# Patient Record
(demographics unavailable — no encounter records)

---

## 2024-11-27 NOTE — ASSESSMENT
[Diabetes Foot Care] : diabetes foot care [Long Term Vascular Complications] : long term vascular complications of diabetes [Carbohydrate Consistent Diet] : carbohydrate consistent diet [Importance of Diet and Exercise] : importance of diet and exercise to improve glycemic control, achieve weight loss and improve cardiovascular health [Hypoglycemia Management] : hypoglycemia management [Action and use of Insulin] : action and use of short and long-acting insulin [Self Monitoring of Blood Glucose] : self monitoring of blood glucose [Injection Technique, Storage, Sharps Disposal] : injection technique, storage, and sharps disposal [Retinopathy Screening] : Patient was referred to ophthalmology for retinopathy screening [FreeTextEntry1] : Diabetes, likely type 2 vs SHERRELL Diagnosed 15 yrs ago, +zinc transporter Ab, 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 has underlying CKD, nephropathy and b/l retinopathy, saw nephrology and no longer on losartan, egfr 19  POC HgbA1c 6.6% today, markedly improved.  Reviewwed labs from 4/4/24 (scanned into EHR), tsh normal at 3, noted crt elevated at 3.99, egfr 39, urine microalb/crt elevated. Continue f/u with nephro Patient is on MDI, checking fs 3-4x.day and noted frequent adjustment in insulin therapy, good candidate for CGM device. Waiting for DEXCOM g7 Continue Lantus 20 qhs (advised to keep consistent dosing) - [facility] Continue premeal lispro 8-12-12U TID ac Continue Ozempic 0.5mg weekly PCP recently discontinued lisinopril due to swelling of feet and replaced with Amlodipine.  Continue Atorvastatin 40mg daily, ) for HLD, LDL was noted 108mg/ld as per labs reviewed from 4/4/24. No h/o foot ulcers, up to date with ophthalmology, has h/o bilateral retinopathy,.  Answered all questions today; patient verbalized understanding of the above RTO in 3 months.

## 2024-11-27 NOTE — PHYSICAL EXAM
[Alert] : alert [No Acute Distress] : no acute distress [Well Developed] : well developed [Normal Sclera/Conjunctiva] : normal sclera/conjunctiva [EOMI] : extra ocular movement intact [No Proptosis] : no proptosis [No Lid Lag] : no lid lag [Normal Hearing] : hearing was normal [No LAD] : no lymphadenopathy [Supple] : the neck was supple [Thyroid Not Enlarged] : the thyroid was not enlarged [No Thyroid Nodules] : no palpable thyroid nodules [No Respiratory Distress] : no respiratory distress [No Accessory Muscle Use] : no accessory muscle use [Normal Rate and Effort] : normal respiratory rate and effort [Clear to Auscultation] : lungs were clear to auscultation bilaterally [Normal S1, S2] : normal S1 and S2 [No Murmurs] : no murmurs [Normal Rate] : heart rate was normal [Regular Rhythm] : with a regular rhythm [Acanthosis Nigricans] : acanthosis nigricans present [Normal Sensation on Monofilament Testing] : normal sensation on monofilament testing of lower extremities [Oriented x3] : oriented to person, place, and time [Normal Insight/Judgement] : insight and judgment were intact [Abdominal Striae] : no abdominal striae [Foot Ulcers] : no foot ulcers

## 2024-11-27 NOTE — HISTORY OF PRESENT ILLNESS
[Continuous Glucose Monitoring] : Continuous Glucose Monitoring: Yes [Jesus] : Jesus [FreeTextEntry1] : ROLANDO GUERRA  is a 40 yo male with past medical history of diabetes w/ CKD, nephropathy and b/l retinopathy, HTN, HLD who presents for management of diabetes.  He was diagnosed w/ diabetes 15 years ago. He has not been seen in endocrine office since Feb 2023. He is currently taking Trulicity 3mg weekly, Lantus 15-25U at bedtime and humalog 10U TId ac. He notes PCP recently prescribed Omeprazole however not using yet.  Today patient is here with wife.  Recalls AM fasting ranges from 80-150s, prelunch 100-130, bedtime ranges frmo 200. He denies hypoglycemic symptoms.  Regarding DM complications, he has underlying b/l retinopathy s/p laser injections and surgery, follows regularly with ophthalmology, last visit was last week.  He also has CKD and proteinuria. He notes improvement in polyuria and polydipsia and nephrology, has renal biopsy.   [FreeTextEntry2] : 70 [FreeTextEntry3] : 23+7 [FreeTextEntry4] : 0+0 [de-identified] : 7.2 [FreeTextEntry5] : 161

## 2025-03-04 NOTE — PHYSICAL EXAM
[Alert] : alert [Normal Sclera/Conjunctiva] : normal sclera/conjunctiva [Normal Outer Ear/Nose] : the ears and nose were normal in appearance [No Neck Mass] : no neck mass was observed [No Respiratory Distress] : no respiratory distress [Normal Bowel Sounds] : normal bowel sounds [Cranial Nerves Intact] : cranial nerves 2-12 were intact [Oriented x3] : oriented to person, place, and time

## 2025-03-05 NOTE — HISTORY OF PRESENT ILLNESS
[Continuous Glucose Monitoring] : Continuous Glucose Monitoring: Yes [Jesus] : Jesus [FreeTextEntry1] : ROLANDO GUERRA is a 38 yo male with past medical history of diabetes w/ CKD, nephropathy and b/l retinopathy, HTN, HLD who presents for management of diabetes. This is my first visit with this patient.  He is here with his wife today.  He states that he will be starting peritoneal dialysis soon  He was diagnosed w/ diabetes 15 years ago.   Current regimen - Lantus 20 qhs (advised to keep consistent dosing),  Lispro 8-12-12U TID ac and Ozempic 0.5 mg once weekly  Regarding DM complications, he has underlying b/l retinopathy s/p laser injections and surgery, follows regularly with ophthalmology, last visit was last week. He also has CKD and proteinuria. He notes improvement in polyuria and polydipsia and nephrology, has renal biopsy.   Dr. Wells at [facility] - Ophthalmology Fam Hx - brother and father  from heart disease and DM [FreeTextEntry2] : 78 [FreeTextEntry3] : 19+2 [FreeTextEntry4] : 1 [de-identified] : 6.7 [FreeTextEntry5] : 142 [FreeTextEntry6] : 31.5

## 2025-03-05 NOTE — HISTORY OF PRESENT ILLNESS
[Continuous Glucose Monitoring] : Continuous Glucose Monitoring: Yes [Jesus] : Jesus [FreeTextEntry1] : ROLANDO GUERRA is a 40 yo male with past medical history of diabetes w/ CKD, nephropathy and b/l retinopathy, HTN, HLD who presents for management of diabetes. This is my first visit with this patient.  He is here with his wife today.  He states that he will be starting peritoneal dialysis soon  He was diagnosed w/ diabetes 15 years ago.   Current regimen - Lantus 20 qhs (advised to keep consistent dosing),  Lispro 8-12-12U TID ac and Ozempic 0.5 mg once weekly  Regarding DM complications, he has underlying b/l retinopathy s/p laser injections and surgery, follows regularly with ophthalmology, last visit was last week. He also has CKD and proteinuria. He notes improvement in polyuria and polydipsia and nephrology, has renal biopsy.   Dr. Wells at [facility] - Ophthalmology Fam Hx - brother and father  from heart disease and DM [FreeTextEntry2] : 78 [FreeTextEntry3] : 19+2 [FreeTextEntry4] : 1 [de-identified] : 6.7 [FreeTextEntry5] : 142 [FreeTextEntry6] : 31.5

## 2025-03-05 NOTE — HISTORY OF PRESENT ILLNESS
[Continuous Glucose Monitoring] : Continuous Glucose Monitoring: Yes [Jesus] : Jseus [FreeTextEntry1] : ROLANDO GUERRA is a 38 yo male with past medical history of diabetes w/ CKD, nephropathy and b/l retinopathy, HTN, HLD who presents for management of diabetes. This is my first visit with this patient.  He is here with his wife today.  He states that he will be starting peritoneal dialysis soon  He was diagnosed w/ diabetes 15 years ago.   Current regimen - Lantus 20 qhs (advised to keep consistent dosing),  Lispro 8-12-12U TID ac and Ozempic 0.5 mg once weekly  Regarding DM complications, he has underlying b/l retinopathy s/p laser injections and surgery, follows regularly with ophthalmology, last visit was last week. He also has CKD and proteinuria. He notes improvement in polyuria and polydipsia and nephrology, has renal biopsy.   Dr. Wells at [facility] - Ophthalmology Fam Hx - brother and father  from heart disease and DM [FreeTextEntry2] : 78 [FreeTextEntry3] : 19+2 [FreeTextEntry4] : 1 [de-identified] : 6.7 [FreeTextEntry5] : 142 [FreeTextEntry6] : 31.5

## 2025-05-15 NOTE — HISTORY OF PRESENT ILLNESS
[Continuous Glucose Monitoring] : Continuous Glucose Monitoring: Yes [Jesus] : Jesus [FreeTextEntry1] : ROLANDO GUERRA is a 38 yo male with past medical history of diabetes w/ CKD, nephropathy and b/l retinopathy, HTN, HLD who presents for management of diabetes.  He is here with his wife today.  He started doing peritoneal dialysis in 2025 and is on the Kidney Transplant List w/ [place] He was diagnosed w/ diabetes 15 years ago.   Current regimen - Lantus 20 qhs (advised to keep consistent dosing),  Lispro 8-12-12U TID ac and Ozempic 0.5 mg once weekly HbA1C in 2025 was 5.6 % ( done at [place])  Regarding DM complications, he has underlying b/l retinopathy s/p laser injections and surgery, follows regularly with ophthalmology, last visit was last week. He also has CKD and proteinuria. He notes improvement in polyuria and polydipsia and nephrology, has renal biopsy.   Dr. Wells at [facility] - Ophthalmology Fam Hx - brother and father  from heart disease and DM [FreeTextEntry2] : 68 [FreeTextEntry3] : 29+5 [FreeTextEntry4] : 0 [de-identified] : 7.3 [FreeTextEntry5] : 166 [FreeTextEntry6] : 28.3

## 2025-05-15 NOTE — REVIEW OF SYSTEMS
[Negative] : Endocrine Nasalis-Muscle-Based Myocutaneous Island Pedicle Flap Text: Using a #15 blade, an incision was made around the donor flap to the level of the nasalis muscle. Wide lateral undermining was then performed in both the subcutaneous plane above the nasalis muscle, and in a submuscular plane just above periosteum. This allowed the formation of a free nasalis muscle axial pedicle (based on the angular artery) which was still attached to the actual cutaneous flap, increasing its mobility and vascular viability. Hemostasis was obtained with pinpoint electrocoagulation. The flap was mobilized into position and the pivotal anchor points positioned and stabilized with buried interrupted sutures. Subcutaneous and dermal tissues were closed in a multilayered fashion with sutures. Tissue redundancies were excised, and the epidermal edges were apposed without significant tension and sutured with sutures.

## 2025-07-28 NOTE — ASSESSMENT
[Diabetes Foot Care] : diabetes foot care [Long Term Vascular Complications] : long term vascular complications of diabetes [Carbohydrate Consistent Diet] : carbohydrate consistent diet [Importance of Diet and Exercise] : importance of diet and exercise to improve glycemic control, achieve weight loss and improve cardiovascular health [Hypoglycemia Management] : hypoglycemia management [Action and use of Insulin] : action and use of short and long-acting insulin [Self Monitoring of Blood Glucose] : self monitoring of blood glucose [Injection Technique, Storage, Sharps Disposal] : injection technique, storage, and sharps disposal [Retinopathy Screening] : Patient was referred to ophthalmology for retinopathy screening [FreeTextEntry1] : Diabetes, likely type 2 vs SHERRELL Diagnosed 15 yrs ago, +zinc transporter Ab, 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 has underlying CKD, nephropathy and b/l retinopathy, saw nephrology and no longer on losartan, egfr 19  POC HgbA1c 6.4% today, markedly improved.  Jesus cgm 73%, 17% and 1 % very high and 5% lows.  Reviewed labs from 11/13/24 (scanned into EHR), tsh normal at 3, noted crt elevated at 3.99, egfr 39, urine microalb/crt elevated. Continue f/u with nephro h/o stent  Patient is on MDI, checking fs 3-4x.day and noted frequent adjustment in insulin therapy, good candidate for CGM device. Waiting for DEXCOM g7 Continue Lantus 8qhs (advised to keep consistent dosing) Continue premeal lispro 8-12-12U TID ac Continue Ozempic 0.5mg weekly PCP discontinued lisinopril due to swelling of feet and replaced with Amlodipine.  Continue Atorvastatin 40mg daily, ) for HLD, LDL was noted 108mg/ld as per labs reviewed from 4/4/24. No h/o foot ulcers, up to date with ophthalmology in July 2025, has h/o bilateral retinopathy, stable  Answered all questions today; patient verbalized understanding of the above RTO in 3 months

## 2025-07-28 NOTE — HISTORY OF PRESENT ILLNESS
[Continuous Glucose Monitoring] : Continuous Glucose Monitoring: Yes [Jesus] : Jesus [FreeTextEntry1] : ROLANDO GUERRA  is a 41 yo male with past medical history of diabetes w/ CKD, nephropathy and b/l retinopathy, HTN, HLD who presents for management of diabetes.  He was diagnosed w/ diabetes 15 years ago. He has not been seen in endocrine office since Feb 2023. He is currently taking Trulicity 3mg weekly, Lantus 15-25U at bedtime and humalog 10U TId ac. He notes PCP recently prescribed Omeprazole however not using yet.  Today patient is here with wife.  Recalls AM fasting ranges from 80-150s, prelunch 100-130, bedtime ranges frmo 200. He denies hypoglycemic symptoms.  Regarding DM complications, he has underlying b/l retinopathy s/p laser injections and surgery, follows regularly with ophthalmology, last visit was last week.  He also has CKD and proteinuria. He notes improvement in polyuria and polydipsia and nephrology, has renal biopsy.   [FreeTextEntry2] : 70 [FreeTextEntry3] : 23+7 [FreeTextEntry4] : 0+0 [de-identified] : 7.2 [FreeTextEntry5] : 161

## 2025-07-28 NOTE — HISTORY OF PRESENT ILLNESS
[Continuous Glucose Monitoring] : Continuous Glucose Monitoring: Yes [Jesus] : Jesus [FreeTextEntry1] : ROLANDO GUERRA  is a 41 yo male with past medical history of diabetes w/ CKD, nephropathy and b/l retinopathy, HTN, HLD who presents for management of diabetes.  He was diagnosed w/ diabetes 15 years ago. He has not been seen in endocrine office since Feb 2023. He is currently taking Trulicity 3mg weekly, Lantus 15-25U at bedtime and humalog 10U TId ac. He notes PCP recently prescribed Omeprazole however not using yet.  Today patient is here with wife.  Recalls AM fasting ranges from 80-150s, prelunch 100-130, bedtime ranges frmo 200. He denies hypoglycemic symptoms.  Regarding DM complications, he has underlying b/l retinopathy s/p laser injections and surgery, follows regularly with ophthalmology, last visit was last week.  He also has CKD and proteinuria. He notes improvement in polyuria and polydipsia and nephrology, has renal biopsy.   [FreeTextEntry2] : 70 [FreeTextEntry3] : 23+7 [FreeTextEntry4] : 0+0 [de-identified] : 7.2 [FreeTextEntry5] : 161